# Patient Record
Sex: MALE | Race: ASIAN
[De-identification: names, ages, dates, MRNs, and addresses within clinical notes are randomized per-mention and may not be internally consistent; named-entity substitution may affect disease eponyms.]

---

## 2021-06-03 ENCOUNTER — FORM ENCOUNTER (OUTPATIENT)
Age: 76
End: 2021-06-03

## 2021-10-04 ENCOUNTER — NON-APPOINTMENT (OUTPATIENT)
Age: 76
End: 2021-10-04

## 2021-10-11 ENCOUNTER — APPOINTMENT (OUTPATIENT)
Dept: UROLOGY | Facility: CLINIC | Age: 76
End: 2021-10-11
Payer: MEDICARE

## 2021-10-11 VITALS — HEART RATE: 93 BPM | DIASTOLIC BLOOD PRESSURE: 77 MMHG | SYSTOLIC BLOOD PRESSURE: 157 MMHG

## 2021-10-11 DIAGNOSIS — C61 MALIGNANT NEOPLASM OF PROSTATE: ICD-10-CM

## 2021-10-11 LAB
APPEARANCE: CLEAR
BACTERIA: NEGATIVE
BILIRUBIN URINE: NEGATIVE
BLOOD URINE: ABNORMAL
COLOR: NORMAL
GLUCOSE QUALITATIVE U: NEGATIVE
HYALINE CASTS: 0 /LPF
KETONES URINE: NEGATIVE
LEUKOCYTE ESTERASE URINE: NEGATIVE
MICROSCOPIC-UA: NORMAL
NITRITE URINE: NEGATIVE
PH URINE: 6.5
PROTEIN URINE: ABNORMAL
RED BLOOD CELLS URINE: 16 /HPF
SPECIFIC GRAVITY URINE: 1.01
SQUAMOUS EPITHELIAL CELLS: 0 /HPF
UROBILINOGEN URINE: NORMAL
WHITE BLOOD CELLS URINE: 0 /HPF

## 2021-10-11 PROCEDURE — 99204 OFFICE O/P NEW MOD 45 MIN: CPT

## 2021-10-11 NOTE — HISTORY OF PRESENT ILLNESS
[FreeTextEntry1] : Please refer to URO Consult note \par \par new male hematuria \par previous prostate cancer s/p radiation therapy \par PSA is 0 \par plan cysto and us

## 2021-10-11 NOTE — ADDENDUM
[FreeTextEntry1] : Entered by Florentin Swan, acting as scribe for Dr. Hesham Olivera.\par \par The documentation recorded by the scribe accurately reflects the service I personally performed and the decisions made by me.

## 2021-10-11 NOTE — LETTER BODY
[FreeTextEntry1] : Hamzah Laird MD \par [address], \[place] [address]\par [phone]\par \par Dear Dr. Laird, \par \par Reason for visit: Hematuria.\par \par This is a 76 year-old Cantonese-speaking man with a history of prostate cancer s/p radiation therapy presenting with gross hematuria referred for evaluation.  He notes recent episodes of gross hematuria. He denies any dysuria or urinary incontinence. The patient denies any aggravating or relieving factors. The patient denies any interference of function. The patient is entirely asymptomatic. All other review of systems are negative. He has no cancer in his family medical history. He has previous surgical history. Past medical history, family history and social history were inquired and were noncontributory to current condition. The patient does not use tobacco or drink alcohol. Medications and allergies were reviewed. He has no known allergies to medication. \par \par On examination, the patient is a healthy-appearing gentleman in no acute distress. He is alert and oriented follows commands. He has normal mood and affect. He is normocephalic. Neck is supple. Respirations are unlabored. His abdomen is soft and nontender. Bladder is nonpalpable. No CVA tenderness. Neurologically he is grossly intact. No peripheral edema. Skin without gross abnormality. He has normal male external genitalia. Normal meatus. Bilateral testes are descended intrascrotally and normal to palpation. On rectal examination, there is normal sphincter tone. The prostate is clinically benign without focal induration or nodularity.\par \par His most recent PSA was <0.02, which is undetectable. \par \par Assessment: Gross hematuria.\par \par I counseled the patient on the various etiologies of gross hematuria. I discussed the risk of occult malignancy. I recommended patient obtain urinalysis and urine cytology. I also recommended the patient undergo renal ultrasound and cystoscopy to evaluate for hematuria. I counseled the patient about the procedures. I answered the patient's questions. The risks and expected outcomes were discussed. The patient will follow up as directed and contact me with any questions or concerns. Thank you for the opportunity to participate in the care of Mr. MAN. I will keep you updated on his progress.\par \par Plan: Cystoscopy. Renal ultrasound. Urinalysis. Urine cytology. Follow up as directed.  [Sclera] : the sclera and conjunctiva were normal [Neck Appearance] : the appearance of the neck was normal [Respiration, Rhythm And Depth] : normal respiratory rhythm and effort [Exaggerated Use Of Accessory Muscles For Inspiration] : no accessory muscle use [Chest Visual Inspection Thoracic Asymmetry] : no chest asymmetry [Abnormal Walk] : normal gait [Skin Color & Pigmentation] : normal skin color and pigmentation [Skin Turgor] : normal skin turgor [] : no rash [Oriented To Time, Place, And Person] : oriented to person, place, and time [Impaired Insight] : insight and judgment were intact [Affect] : the affect was normal [FreeTextEntry1] : MSI and CT sites without erythema, drainage or warmth, with edges well approximated.  [FreeTextEntry2] : B/L LE without edema, B/L calves soft, NT as per VN and pt

## 2021-10-12 ENCOUNTER — APPOINTMENT (OUTPATIENT)
Dept: UROLOGY | Facility: CLINIC | Age: 76
End: 2021-10-12
Payer: MEDICARE

## 2021-10-12 VITALS
RESPIRATION RATE: 16 BRPM | HEART RATE: 92 BPM | BODY MASS INDEX: 22.13 KG/M2 | OXYGEN SATURATION: 95 % | TEMPERATURE: 95 F | DIASTOLIC BLOOD PRESSURE: 69 MMHG | SYSTOLIC BLOOD PRESSURE: 151 MMHG | WEIGHT: 133 LBS

## 2021-10-12 LAB — URINE CYTOLOGY: NORMAL

## 2021-10-12 PROCEDURE — 52000 CYSTOURETHROSCOPY: CPT

## 2021-10-12 PROCEDURE — 76775 US EXAM ABDO BACK WALL LIM: CPT

## 2021-10-12 PROCEDURE — 99214 OFFICE O/P EST MOD 30 MIN: CPT | Mod: 25

## 2021-10-12 NOTE — LETTER BODY
[FreeTextEntry1] : Hamzah Laird MD \par [address], \[place] [address]\par [phone]\par \par Dear Dr. Laird, \par \par Reason for visit: Hematuria.\par \par This is a 76 year-old Cantonese-speaking man with a history of prostate cancer s/p radiation therapy presenting with gross hematuria. He returns today for cystoscopy and renal ultrasound.  He notes recent episodes of gross hematuria. He denies any dysuria or urinary incontinence. The patient is entirely asymptomatic. All other review of systems are negative. His social and family history remain unchanged. Past medical history, family history and social history were inquired and were noncontributory to current condition. The patient does not use tobacco or drink alcohol. Medications and allergies were reviewed. He has no known allergies to medication. \par \par On examination, the patient is a healthy-appearing gentleman in no acute distress. He is alert and oriented follows commands. He has normal mood and affect. He is normocephalic. Neck is supple. Respirations are unlabored. His abdomen is soft and nontender. Bladder is nonpalpable. No CVA tenderness. Neurologically he is grossly intact. No peripheral edema. Skin without gross abnormality. He has normal male external genitalia. Normal meatus. Bilateral testes are descended intrascrotally and normal to palpation. On rectal examination, there is normal sphincter tone. The prostate is clinically benign without focal induration or nodularity.\par \par His most recent PSA was <0.02, which is undetectable. 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 \par \par I personally reviewed ultrasound images with the patient today and images demonstrated bilateral nonvascular cysts, 3.0 cm in the right upper pole, 2.9 cm in the right lower pole and 2.0 cm in the left lower pole. Both kidneys appear normal in size and echogenicity. No stones, solid masses or hydronephrosis visualized.\par \par Assessment: Gross hematuria. Radiation cystitis. \par \par I counseled the patient. 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  I recommended the patient consider undergoing hyperbaric oxygen therapy for his radiation cystitis. I counseled the patient regarding the therapy. The risks and benefits were discussed.  I also recommended the undergo CT urogram for further evaluation. Risks and alternatives were discussed. I answered the patient questions. The patient will follow-up as directed and will contact me with any questions or concerns. Thank you for the opportunity to participate in the care of Mr. MAN. I will keep you updated on his progress. \par \par Plan: CT urogram. Hyperbaric Oxygen. Follow up as directed.

## 2021-10-12 NOTE — HISTORY OF PRESENT ILLNESS
[FreeTextEntry1] : Please refer to URO Consult note \par \par FUA hematuria \par gross hematuria  \par radiation cystitis\par get CT urogram  \par plan is hyperbaric oxygen\par

## 2021-10-13 LAB
APPEARANCE: ABNORMAL
BACTERIA: ABNORMAL
BILIRUBIN URINE: ABNORMAL
BLOOD URINE: ABNORMAL
COLOR: ABNORMAL
GLUCOSE QUALITATIVE U: ABNORMAL
KETONES URINE: ABNORMAL
LEUKOCYTE ESTERASE URINE: ABNORMAL
MICROSCOPIC-UA: NORMAL
NITRITE URINE: ABNORMAL
PH URINE: ABNORMAL
PROTEIN URINE: ABNORMAL
RED BLOOD CELLS URINE: 400 /HPF
SPECIFIC GRAVITY URINE: ABNORMAL
SQUAMOUS EPITHELIAL CELLS: 0 /HPF
UROBILINOGEN URINE: ABNORMAL
WHITE BLOOD CELLS URINE: 5 /HPF

## 2021-10-14 LAB — URINE CYTOLOGY: NORMAL

## 2021-10-31 ENCOUNTER — FORM ENCOUNTER (OUTPATIENT)
Age: 76
End: 2021-10-31

## 2021-11-09 ENCOUNTER — FORM ENCOUNTER (OUTPATIENT)
Age: 76
End: 2021-11-09

## 2021-11-18 ENCOUNTER — FORM ENCOUNTER (OUTPATIENT)
Age: 76
End: 2021-11-18

## 2021-12-21 ENCOUNTER — FORM ENCOUNTER (OUTPATIENT)
Age: 76
End: 2021-12-21

## 2022-01-13 ENCOUNTER — APPOINTMENT (OUTPATIENT)
Dept: UROLOGY | Facility: CLINIC | Age: 77
End: 2022-01-13
Payer: MEDICARE

## 2022-01-13 DIAGNOSIS — Z00.00 ENCOUNTER FOR GENERAL ADULT MEDICAL EXAMINATION W/OUT ABNORMAL FINDINGS: ICD-10-CM

## 2022-01-13 DIAGNOSIS — M51.26 OTHER INTERVERTEBRAL DISC DISPLACEMENT, LUMBAR REGION: ICD-10-CM

## 2022-01-13 DIAGNOSIS — R31.0 GROSS HEMATURIA: ICD-10-CM

## 2022-01-13 PROCEDURE — 99214 OFFICE O/P EST MOD 30 MIN: CPT

## 2022-01-13 NOTE — HISTORY OF PRESENT ILLNESS
[FreeTextEntry1] : Please refer to URO Consult note \par \par FUA hematuria \par radiation cystitis - 45 treatments of hyperbaric oxygen \par CT urogram, PSA urinalysis\par follow up in 1 year

## 2022-01-13 NOTE — LETTER BODY
[FreeTextEntry1] : Hamzah Laird MD \par [address], \[place] [address]\par [phone]\par \par Dear Dr. Laird, \par \par Reason for visit: Prostate cancer. Hematuria.\par \par This is a 76 year-old Cantonese-speaking man with a history of prostate cancer s/p radiation therapy, radiation cystitis and gross hematuria. His hematuria evaluation in October 2021 demonstrated evidence of radiation cystitis secondary to his radiation therapy for his prostate cancer.  His renal ultrasound demonstrated bilateral nonvascular cysts. He returns today for follow up. Since he was last see, the patient underwent 45 hyperbaric oxygen treatments for his radiation cystitis. He has not gotten a CT urogram yet. He denies any dysuria or urinary incontinence. All other review of systems are negative. His social and family history remain unchanged. Past medical history, family history and social history were inquired and were noncontributory to current condition. Medications and allergies were reviewed. He has no known allergies to medication. \par \par On examination, the patient is a healthy-appearing gentleman in no acute distress. He is alert and oriented follows commands. He has normal mood and affect. He is normocephalic. Neck is supple. Respirations are unlabored. His abdomen is soft and nontender. Bladder is nonpalpable. No CVA tenderness. Neurologically he is grossly intact. No peripheral edema. Skin without gross abnormality. He has normal male external genitalia. Normal meatus. Bilateral testes are descended intrascrotally and normal to palpation. On rectal examination, there is normal sphincter tone. The prostate is clinically benign without focal induration or nodularity.\par \par His most recent PSA was <0.02, which is undetectable. \par His urinalysis demonstrated evidence of hematuria, 400 RBC/HPF. His urine cytology was negative for high grade urothelial carcinoma. \par \par Assessment: Prostate cancer. Gross hematuria. Radiation cystitis. \par \par I counseled the patient. He is doing better. In terms of his radiation cystitis and gross hematuria, the patient has undergone hyperbaric oxygen treatments for his radiation cystitis. I recommended the patient repeat urinalysis and urine cytology today to ensure stability. I also recommended the patient obtain CT urogram for further evaluation. In terms of his prostate cancer, I recommended the patient repeat PSA and BMP today to ensure stability. He will follow up in 1 year. Patient understands that if he develops gross hematuria or any urinary discomfort, he will contact me for further evaluation. Risks and alternatives were discussed. I answered the patient questions. The patient will follow-up as directed and will contact me with any questions or concerns. Thank you for the opportunity to participate in the care of Mr. MAN. I will keep you updated on his progress. \par \par Plan: CT urogram. PSA. BMP. Urinalysis. Urine cytology  Follow up in 1 year

## 2022-01-14 LAB
ANION GAP SERPL CALC-SCNC: 16 MMOL/L
APPEARANCE: CLEAR
BACTERIA: NEGATIVE
BILIRUBIN URINE: NEGATIVE
BLOOD URINE: ABNORMAL
BUN SERPL-MCNC: 27 MG/DL
CALCIUM SERPL-MCNC: 9.8 MG/DL
CHLORIDE SERPL-SCNC: 102 MMOL/L
CO2 SERPL-SCNC: 20 MMOL/L
COLOR: NORMAL
CREAT SERPL-MCNC: 1.71 MG/DL
GLUCOSE QUALITATIVE U: NEGATIVE
GLUCOSE SERPL-MCNC: 202 MG/DL
HYALINE CASTS: 0 /LPF
KETONES URINE: NEGATIVE
LEUKOCYTE ESTERASE URINE: NEGATIVE
MICROSCOPIC-UA: NORMAL
NITRITE URINE: NEGATIVE
PH URINE: 6.5
POTASSIUM SERPL-SCNC: 4.7 MMOL/L
PROTEIN URINE: ABNORMAL
PSA SERPL-MCNC: <0.01 NG/ML
RED BLOOD CELLS URINE: 136 /HPF
SODIUM SERPL-SCNC: 138 MMOL/L
SPECIFIC GRAVITY URINE: 1.01
SQUAMOUS EPITHELIAL CELLS: 0 /HPF
URINE CYTOLOGY: NORMAL
UROBILINOGEN URINE: NORMAL
WHITE BLOOD CELLS URINE: 1 /HPF

## 2022-01-20 ENCOUNTER — OUTPATIENT (OUTPATIENT)
Dept: OUTPATIENT SERVICES | Facility: HOSPITAL | Age: 77
LOS: 1 days | End: 2022-01-20
Payer: MEDICARE

## 2022-01-20 ENCOUNTER — APPOINTMENT (OUTPATIENT)
Dept: CT IMAGING | Facility: IMAGING CENTER | Age: 77
End: 2022-01-20
Payer: MEDICARE

## 2022-01-20 DIAGNOSIS — R31.0 GROSS HEMATURIA: ICD-10-CM

## 2022-01-20 PROCEDURE — 74178 CT ABD&PLV WO CNTR FLWD CNTR: CPT

## 2022-01-20 PROCEDURE — 74178 CT ABD&PLV WO CNTR FLWD CNTR: CPT | Mod: 26

## 2022-03-01 ENCOUNTER — FORM ENCOUNTER (OUTPATIENT)
Age: 77
End: 2022-03-01

## 2022-03-08 ENCOUNTER — FORM ENCOUNTER (OUTPATIENT)
Age: 77
End: 2022-03-08

## 2022-08-31 ENCOUNTER — FORM ENCOUNTER (OUTPATIENT)
Age: 77
End: 2022-08-31

## 2022-09-06 ENCOUNTER — FORM ENCOUNTER (OUTPATIENT)
Age: 77
End: 2022-09-06

## 2022-09-07 VITALS
WEIGHT: 134 LBS | SYSTOLIC BLOOD PRESSURE: 138 MMHG | DIASTOLIC BLOOD PRESSURE: 76 MMHG | HEIGHT: 64 IN | BODY MASS INDEX: 22.88 KG/M2 | TEMPERATURE: 97 F

## 2023-03-09 ENCOUNTER — NON-APPOINTMENT (OUTPATIENT)
Age: 78
End: 2023-03-09

## 2023-03-09 RX ORDER — SIMVASTATIN 20 MG/1
20 TABLET, FILM COATED ORAL DAILY
Refills: 0 | Status: ACTIVE | COMMUNITY

## 2023-03-09 RX ORDER — FINERENONE 10 MG/1
10 TABLET, FILM COATED ORAL DAILY
Refills: 0 | Status: ACTIVE | COMMUNITY

## 2023-03-09 RX ORDER — LOSARTAN POTASSIUM 100 MG/1
100 TABLET, FILM COATED ORAL DAILY
Refills: 0 | Status: ACTIVE | COMMUNITY

## 2023-07-12 ENCOUNTER — APPOINTMENT (OUTPATIENT)
Dept: NEPHROLOGY | Facility: CLINIC | Age: 78
End: 2023-07-12
Payer: MEDICARE

## 2023-07-12 VITALS
HEIGHT: 64 IN | SYSTOLIC BLOOD PRESSURE: 130 MMHG | DIASTOLIC BLOOD PRESSURE: 62 MMHG | BODY MASS INDEX: 21.34 KG/M2 | TEMPERATURE: 96.9 F | WEIGHT: 125 LBS

## 2023-07-12 PROCEDURE — 99214 OFFICE O/P EST MOD 30 MIN: CPT

## 2023-07-12 RX ORDER — DOCUSATE SODIUM 100 MG/1
100 CAPSULE ORAL
Qty: 60 | Refills: 3 | Status: DISCONTINUED | COMMUNITY
Start: 2022-01-13 | End: 2023-07-12

## 2023-07-12 RX ORDER — ENEMA 19; 7 G/133ML; G/133ML
7-19 ENEMA RECTAL
Qty: 1 | Refills: 0 | Status: DISCONTINUED | COMMUNITY
Start: 2022-01-13 | End: 2023-07-12

## 2023-07-12 RX ORDER — AMLODIPINE BESYLATE 10 MG/1
10 TABLET ORAL DAILY
Refills: 0 | Status: DISCONTINUED | COMMUNITY
End: 2023-07-12

## 2023-07-12 RX ORDER — GLIPIZIDE 5 MG/1
5 TABLET ORAL DAILY
Refills: 0 | Status: DISCONTINUED | COMMUNITY
End: 2023-07-12

## 2023-07-12 NOTE — HISTORY OF PRESENT ILLNESS
[FreeTextEntry1] : Mr. Salinas  presents for follow up today for CKD, and BP control.\par PMD has changed and reports some medication changes as well.\par Reports improved BP control with improved dietary habits and medication changes.\par Has decreased po intake with 10 lbs weight loss recently.\par \par Medication list was reviewed and reconciled with pt and his daughter today.\par

## 2023-07-12 NOTE — PHYSICAL EXAM
[General Appearance - Alert] : alert [General Appearance - In No Acute Distress] : in no acute distress [Sclera] : the sclera and conjunctiva were normal [Neck Appearance] : the appearance of the neck was normal [] : the neck was supple [Auscultation Breath Sounds / Voice Sounds] : lungs were clear to auscultation bilaterally [Heart Sounds] : normal S1 and S2 [Murmurs] : no murmurs [Edema] : there was no peripheral edema [Bowel Sounds] : normal bowel sounds [Abdomen Soft] : soft

## 2023-07-12 NOTE — ASSESSMENT
[FreeTextEntry1] : 78 yo man with DM, HTN and renal dysfunction c/w CKD.\par \par Creat noted with some variation, 1.65 in 8/2018.\par \par Renal parameters and electrolytes stable.\par \par --CKD :  \par    BUN/creat  : 47/1.86 ( 7/6/ 2023) 27/1.54( 9/1/2022),   24/1.48 (3/2/2022),  20/1.68 (11/1/2021) 1.65 ( 2018)\par    Creat slightly increased--may be due to added SGLT2 inhibitor.\par    Recheck in 2 months.  If creat is stable, then plan to continue combination of ARB/ SGLT2 inhibitor and Kerendia.\par    Pt encouraged to increase po fluid and food.\par \par --HTN : BP control improved and stable.\par \par --Weight loss : pt counseled to start calorie intake for adequate nutrition.  Continue to follow.\par \par \par

## 2023-07-12 NOTE — CONSULT LETTER
[Courtesy Letter:] : I had the pleasure of seeing your patient, [unfilled], in my office today. [Please see my note below.] : Please see my note below. [Consult Closing:] : Thank you very much for allowing me to participate in the care of this patient.  If you have any questions, please do not hesitate to contact me. [Sincerely,] : Sincerely, [DrRegla  ___] : Dr. ESCOBEDO

## 2023-09-13 ENCOUNTER — APPOINTMENT (OUTPATIENT)
Dept: NEPHROLOGY | Facility: CLINIC | Age: 78
End: 2023-09-13
Payer: MEDICARE

## 2023-09-13 VITALS
WEIGHT: 129 LBS | BODY MASS INDEX: 22.02 KG/M2 | DIASTOLIC BLOOD PRESSURE: 66 MMHG | OXYGEN SATURATION: 98 % | HEIGHT: 64 IN | RESPIRATION RATE: 18 BRPM | TEMPERATURE: 97.5 F | SYSTOLIC BLOOD PRESSURE: 128 MMHG | HEART RATE: 90 BPM

## 2023-09-13 PROCEDURE — 99214 OFFICE O/P EST MOD 30 MIN: CPT

## 2023-09-13 RX ORDER — EMPAGLIFLOZIN 10 MG/1
10 TABLET, FILM COATED ORAL
Refills: 0 | Status: ACTIVE | COMMUNITY

## 2023-09-13 RX ORDER — ICOSAPENT ETHYL 1000 MG/1
1 CAPSULE ORAL
Refills: 0 | Status: ACTIVE | COMMUNITY

## 2023-09-13 RX ORDER — AMLODIPINE BESYLATE 10 MG/1
10 TABLET ORAL
Refills: 0 | Status: ACTIVE | COMMUNITY

## 2023-11-29 ENCOUNTER — APPOINTMENT (OUTPATIENT)
Dept: NEPHROLOGY | Facility: CLINIC | Age: 78
End: 2023-11-29
Payer: MEDICARE

## 2023-11-29 VITALS
SYSTOLIC BLOOD PRESSURE: 142 MMHG | BODY MASS INDEX: 23.22 KG/M2 | HEART RATE: 67 BPM | TEMPERATURE: 97.2 F | WEIGHT: 136 LBS | DIASTOLIC BLOOD PRESSURE: 70 MMHG | OXYGEN SATURATION: 99 % | HEIGHT: 64 IN

## 2023-11-29 PROCEDURE — 99214 OFFICE O/P EST MOD 30 MIN: CPT

## 2023-11-29 RX ORDER — ATENOLOL 50 MG/1
50 TABLET ORAL DAILY
Qty: 30 | Refills: 0 | Status: ACTIVE | COMMUNITY
Start: 2023-11-29

## 2024-04-19 ENCOUNTER — APPOINTMENT (OUTPATIENT)
Dept: NEPHROLOGY | Facility: CLINIC | Age: 79
End: 2024-04-19
Payer: MEDICARE

## 2024-04-19 VITALS
OXYGEN SATURATION: 98 % | DIASTOLIC BLOOD PRESSURE: 70 MMHG | BODY MASS INDEX: 23.74 KG/M2 | HEIGHT: 63 IN | TEMPERATURE: 98.1 F | SYSTOLIC BLOOD PRESSURE: 146 MMHG | HEART RATE: 60 BPM | WEIGHT: 134 LBS

## 2024-04-19 DIAGNOSIS — I10 ESSENTIAL (PRIMARY) HYPERTENSION: ICD-10-CM

## 2024-04-19 DIAGNOSIS — E11.9 TYPE 2 DIABETES MELLITUS W/OUT COMPLICATIONS: ICD-10-CM

## 2024-04-19 DIAGNOSIS — N18.30 CHRONIC KIDNEY DISEASE, STAGE 3 UNSPECIFIED: ICD-10-CM

## 2024-04-19 PROCEDURE — 99214 OFFICE O/P EST MOD 30 MIN: CPT

## 2024-04-19 RX ORDER — ALLOPURINOL 100 MG/1
100 TABLET ORAL
Refills: 0 | Status: ACTIVE | COMMUNITY

## 2024-04-19 RX ORDER — HYDRALAZINE HYDROCHLORIDE 25 MG/1
25 TABLET ORAL
Refills: 0 | Status: ACTIVE | COMMUNITY

## 2024-07-03 ENCOUNTER — APPOINTMENT (OUTPATIENT)
Dept: ULTRASOUND IMAGING | Facility: CLINIC | Age: 79
End: 2024-07-03
Payer: MEDICARE

## 2024-07-03 ENCOUNTER — OUTPATIENT (OUTPATIENT)
Dept: OUTPATIENT SERVICES | Facility: HOSPITAL | Age: 79
LOS: 1 days | End: 2024-07-03
Payer: MEDICARE

## 2024-07-03 DIAGNOSIS — N18.30 CHRONIC KIDNEY DISEASE, STAGE 3 UNSPECIFIED: ICD-10-CM

## 2024-07-03 PROCEDURE — 76770 US EXAM ABDO BACK WALL COMP: CPT | Mod: 26

## 2024-07-03 PROCEDURE — 76770 US EXAM ABDO BACK WALL COMP: CPT

## 2024-07-19 ENCOUNTER — APPOINTMENT (OUTPATIENT)
Dept: NEPHROLOGY | Facility: CLINIC | Age: 79
End: 2024-07-19
Payer: MEDICARE

## 2024-07-19 VITALS
HEIGHT: 63 IN | SYSTOLIC BLOOD PRESSURE: 132 MMHG | HEART RATE: 100 BPM | TEMPERATURE: 95.4 F | BODY MASS INDEX: 23.92 KG/M2 | DIASTOLIC BLOOD PRESSURE: 68 MMHG | WEIGHT: 135 LBS | OXYGEN SATURATION: 98 %

## 2024-07-19 DIAGNOSIS — E11.9 TYPE 2 DIABETES MELLITUS W/OUT COMPLICATIONS: ICD-10-CM

## 2024-07-19 DIAGNOSIS — I10 ESSENTIAL (PRIMARY) HYPERTENSION: ICD-10-CM

## 2024-07-19 DIAGNOSIS — N18.30 CHRONIC KIDNEY DISEASE, STAGE 3 UNSPECIFIED: ICD-10-CM

## 2024-07-19 PROCEDURE — 99214 OFFICE O/P EST MOD 30 MIN: CPT

## 2025-01-17 ENCOUNTER — APPOINTMENT (OUTPATIENT)
Dept: NEPHROLOGY | Facility: CLINIC | Age: 80
End: 2025-01-17
Payer: MEDICARE

## 2025-01-17 VITALS
SYSTOLIC BLOOD PRESSURE: 140 MMHG | HEIGHT: 63 IN | WEIGHT: 135 LBS | OXYGEN SATURATION: 97 % | HEART RATE: 99 BPM | TEMPERATURE: 96.8 F | DIASTOLIC BLOOD PRESSURE: 76 MMHG | BODY MASS INDEX: 23.92 KG/M2

## 2025-01-17 DIAGNOSIS — N18.30 CHRONIC KIDNEY DISEASE, STAGE 3 UNSPECIFIED: ICD-10-CM

## 2025-01-17 PROCEDURE — 99214 OFFICE O/P EST MOD 30 MIN: CPT

## 2025-01-17 RX ORDER — ATENOLOL 50 MG/1
50 TABLET ORAL DAILY
Qty: 30 | Refills: 0 | Status: ACTIVE | COMMUNITY
Start: 2025-01-17

## 2025-07-18 ENCOUNTER — APPOINTMENT (OUTPATIENT)
Dept: NEPHROLOGY | Facility: CLINIC | Age: 80
End: 2025-07-18
Payer: MEDICARE

## 2025-07-18 VITALS
TEMPERATURE: 96.9 F | DIASTOLIC BLOOD PRESSURE: 64 MMHG | HEART RATE: 60 BPM | SYSTOLIC BLOOD PRESSURE: 130 MMHG | BODY MASS INDEX: 23.57 KG/M2 | WEIGHT: 133 LBS | OXYGEN SATURATION: 97 % | HEIGHT: 63 IN

## 2025-07-18 PROCEDURE — 99213 OFFICE O/P EST LOW 20 MIN: CPT
